# Patient Record
Sex: MALE | Race: WHITE | NOT HISPANIC OR LATINO | URBAN - METROPOLITAN AREA
[De-identification: names, ages, dates, MRNs, and addresses within clinical notes are randomized per-mention and may not be internally consistent; named-entity substitution may affect disease eponyms.]

---

## 2020-11-03 PROCEDURE — 87081 CULTURE SCREEN ONLY: CPT | Performed by: NURSE PRACTITIONER

## 2020-11-07 ENCOUNTER — TELEPHONE (OUTPATIENT)
Dept: URGENT CARE | Facility: CLINIC | Age: 30
End: 2020-11-07

## 2020-11-07 NOTE — TELEPHONE ENCOUNTER
"Notified patient of throat culture result.  He states that yesterday he felt scattered brained and confused \"like I took too much sudafed\".  Patient stated he did not take any other medications yesterday but did take his antibiotic on an empty stomach.  Had diarrhea and tension in his stomach yesterday.  Per DAVID Calabrese, APRN, advised that GI upset can happen with antibiotics and to continue taking antibiotic with food.  Per DAVID Calabrese, if confusion happens again, discontinue and call us back.  Advised if any severe symptoms occur, go to ER.  Patient stated understanding.   "